# Patient Record
Sex: FEMALE | Race: WHITE | URBAN - METROPOLITAN AREA
[De-identification: names, ages, dates, MRNs, and addresses within clinical notes are randomized per-mention and may not be internally consistent; named-entity substitution may affect disease eponyms.]

---

## 2018-10-22 ENCOUNTER — APPOINTMENT (RX ONLY)
Dept: URBAN - METROPOLITAN AREA CLINIC 161 | Facility: CLINIC | Age: 78
Setting detail: DERMATOLOGY
End: 2018-10-22

## 2018-10-22 DIAGNOSIS — L29.8 OTHER PRURITUS: ICD-10-CM

## 2018-10-22 DIAGNOSIS — L29.89 OTHER PRURITUS: ICD-10-CM

## 2018-10-22 DIAGNOSIS — L82.1 OTHER SEBORRHEIC KERATOSIS: ICD-10-CM

## 2018-10-22 PROBLEM — L85.3 XEROSIS CUTIS: Status: ACTIVE | Noted: 2018-10-22

## 2018-10-22 PROBLEM — M12.9 ARTHROPATHY, UNSPECIFIED: Status: ACTIVE | Noted: 2018-10-22

## 2018-10-22 PROCEDURE — ? TREATMENT REGIMEN

## 2018-10-22 PROCEDURE — ? PATIENT SPECIFIC COUNSELING

## 2018-10-22 PROCEDURE — ? PRESCRIPTION

## 2018-10-22 PROCEDURE — ? COUNSELING

## 2018-10-22 PROCEDURE — 99203 OFFICE O/P NEW LOW 30 MIN: CPT

## 2018-10-22 RX ORDER — GABAPENTIN 300 MG/1
1 CAPSULE ORAL TWICE A DAY
Qty: 90 | Refills: 3 | Status: ERX | COMMUNITY
Start: 2018-10-22

## 2018-10-22 RX ADMIN — GABAPENTIN 1: 300 CAPSULE ORAL at 00:00

## 2018-10-22 ASSESSMENT — LOCATION SIMPLE DESCRIPTION DERM
LOCATION SIMPLE: RIGHT EAR
LOCATION SIMPLE: RIGHT BUTTOCK
LOCATION SIMPLE: RIGHT UPPER BACK
LOCATION SIMPLE: LEFT UPPER BACK
LOCATION SIMPLE: LEFT BUTTOCK
LOCATION SIMPLE: ABDOMEN
LOCATION SIMPLE: RIGHT ANTERIOR NECK
LOCATION SIMPLE: LEFT EAR

## 2018-10-22 ASSESSMENT — LOCATION DETAILED DESCRIPTION DERM
LOCATION DETAILED: LEFT MEDIAL UPPER BACK
LOCATION DETAILED: RIGHT CAVUM CONCHA
LOCATION DETAILED: RIGHT LATERAL ABDOMEN
LOCATION DETAILED: LEFT CAVUM CONCHA
LOCATION DETAILED: RIGHT MEDIAL UPPER BACK
LOCATION DETAILED: LEFT BUTTOCK
LOCATION DETAILED: RIGHT CLAVICULAR NECK
LOCATION DETAILED: PERIUMBILICAL SKIN
LOCATION DETAILED: LEFT INFERIOR MEDIAL UPPER BACK
LOCATION DETAILED: RIGHT BUTTOCK

## 2018-10-22 ASSESSMENT — LOCATION ZONE DERM
LOCATION ZONE: TRUNK
LOCATION ZONE: NECK
LOCATION ZONE: EAR

## 2018-10-22 NOTE — PROCEDURE: PATIENT SPECIFIC COUNSELING
Patient reports at least a 1 year history of generalized itching that initially started on her back between the shoulder blades and now has progressed to involve all over her body including her scalp, ear, back, abdomen, and buttocks.  The itching is not associated with a rash.  She reports that the itching is daily during daytime hours but does not wake her up at night and can be quite intense.  She's tried a multitude of over-the-counter creams with no improvement.  She takes Benadryl which gives her some temporary relief from the itching but it also makes her drowsy.  She denies any kidney or liver problems.  She reports that her PCP did do blood work and has not told her that she has any kidney, liver, or thyroid problems or any history of anemia.  She believes she is \"hardwired\" and is predisposed to itching.
Detail Level: Detailed

## 2019-04-22 ENCOUNTER — RX ONLY (OUTPATIENT)
Age: 79
Setting detail: RX ONLY
End: 2019-04-22

## 2019-04-22 RX ORDER — GABAPENTIN 300 MG/1
1 CAPSULE ORAL TID
Qty: 90 | Refills: 5 | Status: ERX | COMMUNITY
Start: 2019-04-22

## 2020-01-14 ENCOUNTER — APPOINTMENT (RX ONLY)
Dept: URBAN - METROPOLITAN AREA CLINIC 158 | Facility: CLINIC | Age: 80
Setting detail: DERMATOLOGY
End: 2020-01-14

## 2020-01-14 DIAGNOSIS — L29.89 OTHER PRURITUS: ICD-10-CM

## 2020-01-14 DIAGNOSIS — L57.0 ACTINIC KERATOSIS: ICD-10-CM

## 2020-01-14 DIAGNOSIS — L71.8 OTHER ROSACEA: ICD-10-CM

## 2020-01-14 DIAGNOSIS — L29.8 OTHER PRURITUS: ICD-10-CM

## 2020-01-14 PROCEDURE — 17003 DESTRUCT PREMALG LES 2-14: CPT

## 2020-01-14 PROCEDURE — ? COUNSELING

## 2020-01-14 PROCEDURE — 17000 DESTRUCT PREMALG LESION: CPT

## 2020-01-14 PROCEDURE — 99213 OFFICE O/P EST LOW 20 MIN: CPT | Mod: 25

## 2020-01-14 PROCEDURE — ? PRESCRIPTION

## 2020-01-14 PROCEDURE — ? PATIENT SPECIFIC COUNSELING

## 2020-01-14 PROCEDURE — ? LIQUID NITROGEN

## 2020-01-14 RX ORDER — GABAPENTIN 300 MG/1
1 CAPSULE ORAL TID
Qty: 90 | Refills: 12 | Status: ERX | COMMUNITY
Start: 2020-01-14

## 2020-01-14 RX ADMIN — GABAPENTIN 1: 300 CAPSULE ORAL at 00:00

## 2020-01-14 ASSESSMENT — LOCATION SIMPLE DESCRIPTION DERM
LOCATION SIMPLE: SUPERIOR FOREHEAD
LOCATION SIMPLE: LEFT EAR
LOCATION SIMPLE: ABDOMEN
LOCATION SIMPLE: LEFT BUTTOCK
LOCATION SIMPLE: RIGHT CHEEK
LOCATION SIMPLE: RIGHT BUTTOCK
LOCATION SIMPLE: LEFT CHEEK
LOCATION SIMPLE: RIGHT EAR
LOCATION SIMPLE: RIGHT ANKLE
LOCATION SIMPLE: RIGHT UPPER BACK

## 2020-01-14 ASSESSMENT — LOCATION ZONE DERM
LOCATION ZONE: LEG
LOCATION ZONE: FACE
LOCATION ZONE: EAR
LOCATION ZONE: TRUNK

## 2020-01-14 ASSESSMENT — LOCATION DETAILED DESCRIPTION DERM
LOCATION DETAILED: LEFT BUTTOCK
LOCATION DETAILED: RIGHT MEDIAL UPPER BACK
LOCATION DETAILED: RIGHT BUTTOCK
LOCATION DETAILED: RIGHT LATERAL ABDOMEN
LOCATION DETAILED: LEFT CENTRAL MALAR CHEEK
LOCATION DETAILED: RIGHT CAVUM CONCHA
LOCATION DETAILED: RIGHT CENTRAL MALAR CHEEK
LOCATION DETAILED: RIGHT POSTERIOR ANKLE
LOCATION DETAILED: SUPERIOR MID FOREHEAD
LOCATION DETAILED: PERIUMBILICAL SKIN
LOCATION DETAILED: LEFT CAVUM CONCHA

## 2020-01-14 ASSESSMENT — SEVERITY ASSESSMENT OVERALL AMONG ALL PATIENTS: IN YOUR EXPERIENCE, AMONG ALL PATIENTS YOU HAVE SEEN WITH THIS CONDITION, HOW SEVERE IS THIS PATIENT'S CONDITION?: MILD

## 2020-01-14 NOTE — PROCEDURE: LIQUID NITROGEN
Post-Care Instructions: I reviewed with the patient in detail post-care instructions. Patient is to wear sunprotection, and avoid picking at any of the treated lesions. Pt may apply Vaseline to crusted or scabbing areas.  Patient made aware that some lesions may take more than one treatment to fully resolve and some lesion may recur.  Patient was instructed to return to the office if lesions are not resolved after 2 months.
Duration Of Freeze Thaw-Cycle (Seconds): 0
Detail Level: Zone
Render Post-Care Instructions In Note?: yes
Consent: The patient's consent was obtained including but not limited to risks of crusting, scabbing, blistering, scarring, darker or lighter pigmentary change, recurrence, incomplete removal and infection.
Render Note In Bullet Format When Appropriate: No

## 2020-01-14 NOTE — PROCEDURE: PATIENT SPECIFIC COUNSELING
Detail Level: Detailed
Carry forward from 10/22/2018: Patient reports at least a 1 year history of generalized itching that initially started on her back between the shoulder blades and now has progressed to involve all over her body including her scalp, ear, back, abdomen, and buttocks.  The itching is not associated with a rash.  She reports that the itching is daily during daytime hours but does not wake her up at night and can be quite intense.  She's tried a multitude of over-the-counter creams with no improvement.  She takes Benadryl which gives her some temporary relief from the itching but it also makes her drowsy.  She denies any kidney or liver problems.  She reports that her PCP did do blood work and has not told her that she has any kidney, liver, or thyroid problems or any history of anemia.  She believes she is \"hardwired\" and is predisposed to itching.\\n\\nI discussed with the patient that the perception of itching of the skin is a normal sensation transmitted by sensory nerves.  Itching can be exacerbated by a multitude of factors including external factors such as dry skin, chaffing, scatching and internal cause (much less common) such as anemia, hypothyroidism, ESRD, or cirrhosis.  It does not appear that she has these comorbidities.   The elderly population also has a higher incidence of idiopathic itching for unknown reasons.  I told the patient that treatment to try to control itching will involve a trial and error process.  There are no treatment that will completely prevent her from feeling the sensation of itching since sensory perception of itching (along with other sensations such as pain, heat, cold, etc..) is a normal function of cutaneous sensory nerves.  I recommend daily frequent moisturization as her skin appear dry.  I will start her on gabapentin 300mg TID; side effects of medication discussed.  Return to clinic if itching does not improve over the next 3 months on medication.\\n\\Alexandra's note (Jan 14 2020): Itching is well control on gabapentin 300mg TID.  She denies any side effects while on medication. She does notice increased itching off the medication for the past month.  Will refill gabapentin 300mg TID to local pharmacy.
Return to clinic if HAK on right lower leg does not resolve in 6-8 weeks.
Detail Level: Simple
No treatment requested.

## 2021-01-18 RX ORDER — GABAPENTIN 300 MG/1
1 CAPSULE ORAL TID
Qty: 90 | Refills: 1 | Status: ERX

## 2024-09-18 NOTE — PROCEDURE: COUNSELING
Patient read below 3dim message on 9/16/24. No upcoming appointment scheduled.  
Sent patient mychart informing that patient is overdue for appointment with Dr Garcia and needs to be scheduled as a consult since it has been over 3 years. Received refill request for Zolpidem. Provided patient with phone number to schedule appointment  
Detail Level: Generalized
Detail Level: Zone